# Patient Record
Sex: MALE | Race: WHITE | Employment: OTHER
[De-identification: names, ages, dates, MRNs, and addresses within clinical notes are randomized per-mention and may not be internally consistent; named-entity substitution may affect disease eponyms.]

---

## 2023-06-01 ENCOUNTER — HOSPITAL ENCOUNTER (OUTPATIENT)
Facility: HOSPITAL | Age: 43
Setting detail: RECURRING SERIES
Discharge: HOME | End: 2023-06-04
Payer: OTHER GOVERNMENT

## 2023-06-01 PROCEDURE — 97112 NEUROMUSCULAR REEDUCATION: CPT

## 2023-06-01 PROCEDURE — 97535 SELF CARE MNGMENT TRAINING: CPT

## 2023-06-01 PROCEDURE — 97161 PT EVAL LOW COMPLEX 20 MIN: CPT

## 2023-06-06 ENCOUNTER — HOSPITAL ENCOUNTER (OUTPATIENT)
Facility: HOSPITAL | Age: 43
Setting detail: RECURRING SERIES
Discharge: HOME | End: 2023-06-09
Payer: OTHER GOVERNMENT

## 2023-06-06 PROCEDURE — 97530 THERAPEUTIC ACTIVITIES: CPT

## 2023-06-06 PROCEDURE — 97112 NEUROMUSCULAR REEDUCATION: CPT

## 2023-06-06 PROCEDURE — 97110 THERAPEUTIC EXERCISES: CPT

## 2023-06-06 NOTE — PROGRESS NOTES
[facility]   7/3/2023  2:30 PM Jeffery Sterling, [facility]   7/6/2023  3:10 PM Kenney [facility]   7/11/2023  3:10 PM Jeffery Sterling, [facility]   7/14/2023  3[facility]   7/18/2023  3:10 PM Jeffery Sterling, [facility]   7/21/2023  3:10 PM Kristie Martinez [facility]

## 2023-06-16 ENCOUNTER — HOSPITAL ENCOUNTER (OUTPATIENT)
Facility: HOSPITAL | Age: 43
Setting detail: RECURRING SERIES
End: 2023-06-16
Payer: OTHER GOVERNMENT

## 2023-06-20 ENCOUNTER — HOSPITAL ENCOUNTER (OUTPATIENT)
Facility: HOSPITAL | Age: 43
Setting detail: RECURRING SERIES
Discharge: HOME | End: 2023-06-23
Payer: OTHER GOVERNMENT

## 2023-06-20 PROCEDURE — 97110 THERAPEUTIC EXERCISES: CPT

## 2023-06-20 PROCEDURE — 97112 NEUROMUSCULAR REEDUCATION: CPT

## 2023-06-20 NOTE — PROGRESS NOTES
Continue plan of care  []  Upgrade activities as tolerated  []  Discharge due to :  []  Other:    Nasir Lewis, PT    6/20/2023    11:54 AM    Future Appointments   Date Time Provider Cruz Ram   6/20/2023  3:10 PM Nasir Lewis PT [facility]   6/23/2023  2:30 PM Axel Carter [facility]   6/27/2023  3:10 PM Terra Bond [facility]   6/29/2023  3:10 PM Woody Martinez [facility]   7/3/2023  2:30 PM Woody Martinez PT [facility]   7/6/2023  3:10 PM [facility]   7/11/2023  3:10 PM Woody Martinez PT [facility]   7/14/2023  3:10 PM [facility]   7/18/2023  3:10 PM Woody Martinez PT [facility]   7/21/2023  3:10 PM Dre Whitley [facility]

## 2023-06-23 ENCOUNTER — HOSPITAL ENCOUNTER (OUTPATIENT)
Facility: HOSPITAL | Age: 43
Setting detail: RECURRING SERIES
Discharge: HOME | End: 2023-06-26
Payer: OTHER GOVERNMENT

## 2023-06-23 ENCOUNTER — TELEPHONE (OUTPATIENT)
Facility: HOSPITAL | Age: 43
End: 2023-06-23

## 2023-06-23 PROCEDURE — 97112 NEUROMUSCULAR REEDUCATION: CPT

## 2023-06-23 PROCEDURE — 97530 THERAPEUTIC ACTIVITIES: CPT

## 2023-06-23 PROCEDURE — 97110 THERAPEUTIC EXERCISES: CPT

## 2023-06-27 ENCOUNTER — HOSPITAL ENCOUNTER (OUTPATIENT)
Facility: HOSPITAL | Age: 43
Setting detail: RECURRING SERIES
Discharge: HOME | End: 2023-06-30
Payer: OTHER GOVERNMENT

## 2023-06-27 PROCEDURE — 97110 THERAPEUTIC EXERCISES: CPT

## 2023-06-27 PROCEDURE — 97530 THERAPEUTIC ACTIVITIES: CPT

## 2023-06-29 ENCOUNTER — HOSPITAL ENCOUNTER (OUTPATIENT)
Facility: HOSPITAL | Age: 43
Setting detail: RECURRING SERIES
End: 2023-06-29
Payer: OTHER GOVERNMENT

## 2023-06-29 PROCEDURE — 97530 THERAPEUTIC ACTIVITIES: CPT

## 2023-06-29 PROCEDURE — 97110 THERAPEUTIC EXERCISES: CPT

## 2023-06-29 PROCEDURE — 97112 NEUROMUSCULAR REEDUCATION: CPT

## 2023-07-03 ENCOUNTER — HOSPITAL ENCOUNTER (OUTPATIENT)
Facility: HOSPITAL | Age: 43
Setting detail: RECURRING SERIES
Discharge: HOME | End: 2023-07-06
Payer: OTHER GOVERNMENT

## 2023-07-03 PROCEDURE — 97112 NEUROMUSCULAR REEDUCATION: CPT

## 2023-07-03 PROCEDURE — 97110 THERAPEUTIC EXERCISES: CPT

## 2023-07-03 PROCEDURE — 97530 THERAPEUTIC ACTIVITIES: CPT

## 2023-07-03 NOTE — PROGRESS NOTES
PN:  2/10 at worst typically but did have 7-8/10 with transfer from sit to stand from a deep sofa lasted about 30 sec  Progressing 6/29/2023     Long Term Goals:     to be accomplished within 7  treatments:     Patient will score at least 72 points on FOTO in order to maximize function and promote patient satisfaction with overall outcome. (Namita Grass is an established functional score where a score of 100 = no disability)  Eval: 53               Current: 61 progressing 6/23/2023     Patient will report less than or equal to 2/10 pain during all functional activities/ADLs in order to improve QOL and return to patient's PLOF. Eval:  pain ranges from 2-10/10; currently 2/10              PN:  2/10 at worst typically but did have 7-8/10 with transfer from sit to stand from a deep sofa lasted about 30 sec  Progressing 6/29/2023     Patient will demonstrate functional and painfree trunk AROM that is with less than or equal to 2/10 pain in order to return to prior functional activities and mobility. Eval: Trunk AROM: WNL except extension to 75%; \"tightness\" reported in lower back during end-range extension and bilateral sidebending           PN Trunk ROM all WNL, ext still only 75% and \"stiff\", stiffness in L SBing and extension. No C/O pain. PROGRESSING 6/29/2023        Patient will demonstrate 5/5 gluteal strength bilaterally in order to improve lumbopelvic stability during functional movements. Eval: left hip abduction 4+/5, right hip abduction 4/5, bilateral hip extension with knee bent 3+/5             PN: 4+/5  Progressing 6/29/2023     Patient will demonstrate proper body mechanics when lifting a 40# box from knee height <> chest height in order to decrease his risk of low back during heavy lifting.               Eval: poor to fair body mechanics              PN:  Pt demonstrated good form for lifting a 25# box from floor to waist Progressing 6/29/2023   Current: Good body mechanics when performing 2x10 of box

## 2023-07-06 ENCOUNTER — HOSPITAL ENCOUNTER (OUTPATIENT)
Facility: HOSPITAL | Age: 43
Setting detail: RECURRING SERIES
Discharge: HOME | End: 2023-07-09
Payer: OTHER GOVERNMENT

## 2023-07-06 PROCEDURE — 97530 THERAPEUTIC ACTIVITIES: CPT

## 2023-07-06 PROCEDURE — 97112 NEUROMUSCULAR REEDUCATION: CPT

## 2023-07-11 ENCOUNTER — HOSPITAL ENCOUNTER (OUTPATIENT)
Facility: HOSPITAL | Age: 43
Setting detail: RECURRING SERIES
Discharge: HOME | End: 2023-07-14
Payer: OTHER GOVERNMENT

## 2023-07-11 PROCEDURE — 97112 NEUROMUSCULAR REEDUCATION: CPT

## 2023-07-11 PROCEDURE — 97530 THERAPEUTIC ACTIVITIES: CPT

## 2023-07-11 NOTE — PROGRESS NOTES
[facility]  [address], [facility], [address] [place], [place] [address]  Phone [phone]  Fax [phone]    Physical Therapy Discharge Summary    Patient name: Marlene Delacruz Start of Care: 2023   Referral source: Jose Armando Turner MD :                Medical Diagnosis: Other low back pain [M54.59]    Onset Date:23               Treatment Diagnosis: M54.59  OTHER LOWER BACK PAIN     Prior Hospitalization: see medical history Provider#: [id number]   Medications: Verified on Patient summary List   Comorbidities: chronic LBP since   Substance Use: [] tobacco use, [] alcohol use, [] other:   Prior Level of Function:   [x] Unrestricted with functional activities and ADL's  [x] No assistive device  [x] active lifestyle, [] moderately active lifestyle, [] sedentary lifestyle  Patients Goals:  \"I'd like to get back to being physically active again without pain. \"      Visits from Start of Care: 11    Missed Visits: 1    Reporting Period : 23 to 23    Assessment / Summary of Care:  Patient has completed 11 visits of skilled PT for treatment of chronic LBP. Patient is doing excellent, including having returned to all of his normal functional activities and gym workouts without symptoms. Patient demonstrates excellent bilateral LE strength as well as excellent body mechanics during lifting and squatting activities. Patient has met all PT goals and is ready to D/C to independent HEP/gym based program at this time. Thank you for the referral to [facility]. Short Term Goals:    to be accomplished within 4 treatments:     Patient will be compliant and independent with prescribed HEP(s) in order to assist in maximizing therapeutic gains and improving overall function.   Eval: HEP to be issued and reviewed with patient within 1-2 visits  Current: Patient reports daily compliance with his HEP.   7/3/23, MET      Patient will report at least a 25% reduction in
Physical Therapy Discharge Instructions    [facility]  [address], [address] [place] [address]  Ph [phone]  Fx [phone]      Patient: Mindy Lackey  : 1980      Continue Home Exercise Program 1 times per day for 4-6 weeks, then decrease to 3 times per week      Continue with    [x] Ice  as needed     [x] Heat           Follow up with MD:     [] Upon completion of therapy     [x] As needed      Recommendations:     [x]   Return to activity with home program    [x]   Return to activity with the following modifications:       []Post Rehab Program    []Join Independent aquatic program     [x]Return to/join local gym      Additional Comments: Thank you for choosing [facility]!     Rody Hugo, PT 2023 10:29 AM
THE [facility]   7/21/2023  3:10 PM Moni Pritchard [facility] THE [facility]

## 2023-07-14 ENCOUNTER — APPOINTMENT (OUTPATIENT)
Facility: HOSPITAL | Age: 43
End: 2023-07-14
Payer: OTHER GOVERNMENT

## 2023-07-18 ENCOUNTER — APPOINTMENT (OUTPATIENT)
Facility: HOSPITAL | Age: 43
End: 2023-07-18
Payer: OTHER GOVERNMENT

## 2023-07-21 ENCOUNTER — APPOINTMENT (OUTPATIENT)
Facility: HOSPITAL | Age: 43
End: 2023-07-21
Payer: OTHER GOVERNMENT

## 2024-01-15 ENCOUNTER — APPOINTMENT (OUTPATIENT)
Facility: HOSPITAL | Age: 44
End: 2024-01-15
Payer: OTHER GOVERNMENT

## 2024-01-15 ENCOUNTER — HOSPITAL ENCOUNTER (EMERGENCY)
Facility: HOSPITAL | Age: 44
Discharge: HOME | End: 2024-01-15
Attending: STUDENT IN AN ORGANIZED HEALTH CARE EDUCATION/TRAINING PROGRAM
Payer: OTHER GOVERNMENT

## 2024-01-15 VITALS
OXYGEN SATURATION: 98 % | TEMPERATURE: 97.6 F | HEART RATE: 88 BPM | SYSTOLIC BLOOD PRESSURE: 154 MMHG | DIASTOLIC BLOOD PRESSURE: 88 MMHG

## 2024-01-15 DIAGNOSIS — D58.2 ELEVATED HEMOGLOBIN (HCC): ICD-10-CM

## 2024-01-15 DIAGNOSIS — R10.32 LEFT LOWER QUADRANT ABDOMINAL PAIN: Primary | ICD-10-CM

## 2024-01-15 DIAGNOSIS — N20.0 NEPHROLITHIASIS: ICD-10-CM

## 2024-01-15 LAB
ALBUMIN SERPL-MCNC: 3.9 G/DL (ref 3.4–5)
ALBUMIN/GLOB SERPL: 1.3 (ref 0.8–1.7)
ALP SERPL-CCNC: 103 U/L (ref 45–117)
ALT SERPL-CCNC: 60 U/L (ref 16–61)
ANION GAP SERPL CALC-SCNC: 9 MMOL/L (ref 3–18)
APPEARANCE UR: ABNORMAL
AST SERPL-CCNC: 44 U/L (ref 10–38)
BACTERIA URNS QL MICRO: NEGATIVE /HPF
BASOPHILS # BLD: 0.1 K/UL (ref 0–0.1)
BASOPHILS NFR BLD: 0 % (ref 0–2)
BILIRUB SERPL-MCNC: 0.5 MG/DL (ref 0.2–1)
BILIRUB UR QL: NEGATIVE
BUN SERPL-MCNC: 13 MG/DL (ref 7–18)
BUN/CREAT SERPL: 9 (ref 12–20)
CALCIUM SERPL-MCNC: 9.9 MG/DL (ref 8.5–10.1)
CHLORIDE SERPL-SCNC: 105 MMOL/L (ref 100–111)
CO2 SERPL-SCNC: 26 MMOL/L (ref 21–32)
COLOR UR: ABNORMAL
CREAT SERPL-MCNC: 1.45 MG/DL (ref 0.6–1.3)
DIFFERENTIAL METHOD BLD: ABNORMAL
EOSINOPHIL # BLD: 0.2 K/UL (ref 0–0.4)
EOSINOPHIL NFR BLD: 1 % (ref 0–5)
EPITH CASTS URNS QL MICRO: ABNORMAL /LPF (ref 0–5)
ERYTHROCYTE [DISTWIDTH] IN BLOOD BY AUTOMATED COUNT: 12.8 % (ref 11.6–14.5)
GLOBULIN SER CALC-MCNC: 3.1 G/DL (ref 2–4)
GLUCOSE SERPL-MCNC: 112 MG/DL (ref 74–99)
GLUCOSE UR STRIP.AUTO-MCNC: NEGATIVE MG/DL
HCT VFR BLD AUTO: 50.8 % (ref 36–48)
HGB BLD-MCNC: 18.3 G/DL (ref 13–16)
HGB UR QL STRIP: ABNORMAL
IMM GRANULOCYTES # BLD AUTO: 0.1 K/UL (ref 0–0.04)
IMM GRANULOCYTES NFR BLD AUTO: 0 % (ref 0–0.5)
KETONES UR QL STRIP.AUTO: NEGATIVE MG/DL
LEUKOCYTE ESTERASE UR QL STRIP.AUTO: ABNORMAL
LIPASE SERPL-CCNC: 39 U/L (ref 13–75)
LYMPHOCYTES # BLD: 3.1 K/UL (ref 0.9–3.6)
LYMPHOCYTES NFR BLD: 25 % (ref 21–52)
MCH RBC QN AUTO: 32.2 PG (ref 24–34)
MCHC RBC AUTO-ENTMCNC: 36 G/DL (ref 31–37)
MCV RBC AUTO: 89.3 FL (ref 78–100)
MONOCYTES # BLD: 1.3 K/UL (ref 0.05–1.2)
MONOCYTES NFR BLD: 10 % (ref 3–10)
NEUTS SEG # BLD: 8 K/UL (ref 1.8–8)
NEUTS SEG NFR BLD: 64 % (ref 40–73)
NITRITE UR QL STRIP.AUTO: NEGATIVE
NRBC # BLD: 0 K/UL (ref 0–0.01)
NRBC BLD-RTO: 0 PER 100 WBC
PH UR STRIP: 8 (ref 5–8)
PLATELET # BLD AUTO: 256 K/UL (ref 135–420)
PMV BLD AUTO: 10.3 FL (ref 9.2–11.8)
POTASSIUM SERPL-SCNC: 4 MMOL/L (ref 3.5–5.5)
PROT SERPL-MCNC: 7 G/DL (ref 6.4–8.2)
PROT UR STRIP-MCNC: 30 MG/DL
RBC # BLD AUTO: 5.69 M/UL (ref 4.35–5.65)
RBC #/AREA URNS HPF: >100 /HPF (ref 0–5)
SODIUM SERPL-SCNC: 140 MMOL/L (ref 136–145)
SP GR UR REFRACTOMETRY: 1.01 (ref 1–1.03)
TROPONIN I SERPL HS-MCNC: 4 NG/L (ref 0–78)
UROBILINOGEN UR QL STRIP.AUTO: 0.2 EU/DL (ref 0.2–1)
WBC # BLD AUTO: 12.6 K/UL (ref 4.6–13.2)
WBC URNS QL MICRO: ABNORMAL /HPF (ref 0–5)

## 2024-01-15 PROCEDURE — 74176 CT ABD & PELVIS W/O CONTRAST: CPT

## 2024-01-15 PROCEDURE — 85025 COMPLETE CBC W/AUTO DIFF WBC: CPT

## 2024-01-15 PROCEDURE — 99284 EMERGENCY DEPT VISIT MOD MDM: CPT

## 2024-01-15 PROCEDURE — 81001 URINALYSIS AUTO W/SCOPE: CPT

## 2024-01-15 PROCEDURE — 84484 ASSAY OF TROPONIN QUANT: CPT

## 2024-01-15 PROCEDURE — 80053 COMPREHEN METABOLIC PANEL: CPT

## 2024-01-15 PROCEDURE — 96360 HYDRATION IV INFUSION INIT: CPT

## 2024-01-15 PROCEDURE — 2580000003 HC RX 258: Performed by: STUDENT IN AN ORGANIZED HEALTH CARE EDUCATION/TRAINING PROGRAM

## 2024-01-15 PROCEDURE — 83690 ASSAY OF LIPASE: CPT

## 2024-01-15 RX ORDER — SODIUM CHLORIDE, SODIUM LACTATE, POTASSIUM CHLORIDE, AND CALCIUM CHLORIDE .6; .31; .03; .02 G/100ML; G/100ML; G/100ML; G/100ML
1000 INJECTION, SOLUTION INTRAVENOUS ONCE
Status: COMPLETED | OUTPATIENT
Start: 2024-01-15 | End: 2024-01-15

## 2024-01-15 RX ADMIN — SODIUM CHLORIDE, SODIUM LACTATE, POTASSIUM CHLORIDE, AND CALCIUM CHLORIDE 1000 ML: 600; 310; 30; 20 INJECTION, SOLUTION INTRAVENOUS at 22:26

## 2024-01-15 ASSESSMENT — PAIN - FUNCTIONAL ASSESSMENT: PAIN_FUNCTIONAL_ASSESSMENT: 0-10

## 2024-01-15 ASSESSMENT — PAIN SCALES - GENERAL: PAINLEVEL_OUTOF10: 10

## 2024-01-16 NOTE — ED PROVIDER NOTES
[facility] EMERGENCY DEPT  EMERGENCY DEPARTMENT ENCOUNTER    Patient Name: Cam Clark  MRN: [id number]  YOB: 1980  Provider: Thaddeus Miranda MD  PCP: Stephanie Cain MD  Time/Date of evaluation: 10:16 PM EST on 1/15/24    History of Presenting Illness     Chief Complaint   Patient presents with    Abdominal Pain     LLQ abdominal pain that started 1.5 hours prior to arrival. Patient denies any n/v/d or a gi history.        History Provided by: Patient and Patient's Wife   History is limited by: Nothing    HISTORY (Narative):   43 y.o. male accompanied by his wife complaining of resolved episode of left flank vs left lower quadrant abdominal pain. Started suddenly. Intermittently more intense. Radiated to left periumbilical region prior to resolving. Denies associated nausea, vomiting, chest pain, SOB, diarrhea, dysuria, hematuria, or increased urinary frequency. No h/o similar sensation.       Nursing Notes were all reviewed and agreed with or any disagreements were addressed in the HPI.    Past History     PAST MEDICAL HISTORY:  No past medical history on file.    PAST SURGICAL HISTORY:  No past surgical history on file.    FAMILY HISTORY:  No family history on file.    SOCIAL HISTORY:       MEDICATIONS:  No current facility-administered medications for this encounter.     No current outpatient medications on file.       ALLERGIES:  No Known Allergies    SOCIAL DETERMINANTS OF HEALTH:  Social Determinants of Health     Tobacco Use: Not on file   Alcohol Use: Not on file   Financial Resource Strain: Not on file   Food Insecurity: Not on file   Transportation Needs: Not on file   Physical Activity: Not on file   Stress: Not on file   Social Connections: Not on file   Intimate Partner Violence: Not on file   Depression: Not on file   Housing Stability: Not on file   Interpersonal Safety: Not on file   Utilities: Not on file       Review of Systems     Negative except as listed above in HPI.    Physical Exam

## 2024-01-16 NOTE — DISCHARGE INSTRUCTIONS
As discussed, you should follow up with your urologist about the kidney stones. You should also follow up with your PCP to discuss the elevated hemoglobin level.    Return to the emergency department for any new or worsening symptoms.